# Patient Record
Sex: MALE | Race: BLACK OR AFRICAN AMERICAN | NOT HISPANIC OR LATINO | Employment: FULL TIME | URBAN - METROPOLITAN AREA
[De-identification: names, ages, dates, MRNs, and addresses within clinical notes are randomized per-mention and may not be internally consistent; named-entity substitution may affect disease eponyms.]

---

## 2023-05-26 ENCOUNTER — OFFICE VISIT (OUTPATIENT)
Dept: PRIMARY CARE | Facility: CLINIC | Age: 43
End: 2023-05-26
Payer: COMMERCIAL

## 2023-05-26 ENCOUNTER — LAB (OUTPATIENT)
Dept: LAB | Facility: LAB | Age: 43
End: 2023-05-26
Payer: COMMERCIAL

## 2023-05-26 VITALS
TEMPERATURE: 98.6 F | WEIGHT: 205 LBS | HEART RATE: 65 BPM | SYSTOLIC BLOOD PRESSURE: 124 MMHG | DIASTOLIC BLOOD PRESSURE: 83 MMHG | HEIGHT: 73 IN | BODY MASS INDEX: 27.17 KG/M2

## 2023-05-26 DIAGNOSIS — M76.892 HAMSTRING TENDONITIS OF LEFT THIGH: ICD-10-CM

## 2023-05-26 DIAGNOSIS — N18.31 STAGE 3A CHRONIC KIDNEY DISEASE (MULTI): ICD-10-CM

## 2023-05-26 DIAGNOSIS — E55.9 VITAMIN D DEFICIENCY: ICD-10-CM

## 2023-05-26 DIAGNOSIS — R73.03 PREDIABETES: ICD-10-CM

## 2023-05-26 DIAGNOSIS — R73.03 PREDIABETES: Primary | ICD-10-CM

## 2023-05-26 PROBLEM — J30.9 ALLERGIC RHINITIS: Status: ACTIVE | Noted: 2023-05-26

## 2023-05-26 PROBLEM — M70.50 PES ANSERINUS BURSITIS: Status: ACTIVE | Noted: 2021-08-24

## 2023-05-26 PROBLEM — Z52.4 KIDNEY DONOR: Status: ACTIVE | Noted: 2022-02-02

## 2023-05-26 PROBLEM — R79.89 ELEVATED SERUM CREATININE: Status: ACTIVE | Noted: 2022-02-02

## 2023-05-26 PROBLEM — M75.110 INCOMPLETE TEAR OF ROTATOR CUFF: Status: ACTIVE | Noted: 2018-11-27

## 2023-05-26 PROBLEM — M75.40 IMPINGEMENT SYNDROME OF SHOULDER REGION: Status: ACTIVE | Noted: 2018-11-27

## 2023-05-26 PROBLEM — L70.9 ACNE: Status: ACTIVE | Noted: 2023-05-26

## 2023-05-26 PROBLEM — N18.9 CKD (CHRONIC KIDNEY DISEASE): Status: ACTIVE | Noted: 2023-05-26

## 2023-05-26 PROBLEM — Z90.5 HISTORY OF KIDNEY DONATION: Status: ACTIVE | Noted: 2019-12-20

## 2023-05-26 PROBLEM — R22.2 MASS OF SUBCUTANEOUS TISSUE OF BACK: Status: ACTIVE | Noted: 2020-09-24

## 2023-05-26 PROBLEM — R53.83 FATIGUE: Status: ACTIVE | Noted: 2023-05-26

## 2023-05-26 PROBLEM — M25.519 SHOULDER PAIN: Status: ACTIVE | Noted: 2023-05-26

## 2023-05-26 LAB
ALANINE AMINOTRANSFERASE (SGPT) (U/L) IN SER/PLAS: 19 U/L (ref 10–52)
ALBUMIN (G/DL) IN SER/PLAS: 4.4 G/DL (ref 3.4–5)
ALBUMIN (MG/L) IN URINE: <7 MG/L
ALBUMIN/CREATININE (UG/MG) IN URINE: NORMAL UG/MG CRT (ref 0–30)
ALKALINE PHOSPHATASE (U/L) IN SER/PLAS: 57 U/L (ref 33–120)
ANION GAP IN SER/PLAS: 10 MMOL/L (ref 10–20)
ASPARTATE AMINOTRANSFERASE (SGOT) (U/L) IN SER/PLAS: 24 U/L (ref 9–39)
BILIRUBIN TOTAL (MG/DL) IN SER/PLAS: 0.7 MG/DL (ref 0–1.2)
CALCIDIOL (25 OH VITAMIN D3) (NG/ML) IN SER/PLAS: 47 NG/ML
CALCIUM (MG/DL) IN SER/PLAS: 9.7 MG/DL (ref 8.6–10.6)
CARBON DIOXIDE, TOTAL (MMOL/L) IN SER/PLAS: 29 MMOL/L (ref 21–32)
CHLORIDE (MMOL/L) IN SER/PLAS: 107 MMOL/L (ref 98–107)
CHOLESTEROL (MG/DL) IN SER/PLAS: 171 MG/DL (ref 0–199)
CHOLESTEROL IN HDL (MG/DL) IN SER/PLAS: 45.2 MG/DL
CHOLESTEROL/HDL RATIO: 3.8
CREATININE (MG/DL) IN SER/PLAS: 1.3 MG/DL (ref 0.5–1.3)
CREATININE (MG/DL) IN URINE: 141 MG/DL (ref 20–370)
ERYTHROCYTE DISTRIBUTION WIDTH (RATIO) BY AUTOMATED COUNT: 12.8 % (ref 11.5–14.5)
ERYTHROCYTE MEAN CORPUSCULAR HEMOGLOBIN CONCENTRATION (G/DL) BY AUTOMATED: 34.1 G/DL (ref 32–36)
ERYTHROCYTE MEAN CORPUSCULAR VOLUME (FL) BY AUTOMATED COUNT: 91 FL (ref 80–100)
ERYTHROCYTES (10*6/UL) IN BLOOD BY AUTOMATED COUNT: 4.96 X10E12/L (ref 4.5–5.9)
ESTIMATED AVERAGE GLUCOSE FOR HBA1C: 105 MG/DL
GFR MALE: 70 ML/MIN/1.73M2
GLUCOSE (MG/DL) IN SER/PLAS: 85 MG/DL (ref 74–99)
HEMATOCRIT (%) IN BLOOD BY AUTOMATED COUNT: 44.9 % (ref 41–52)
HEMOGLOBIN (G/DL) IN BLOOD: 15.3 G/DL (ref 13.5–17.5)
HEMOGLOBIN A1C/HEMOGLOBIN TOTAL IN BLOOD: 5.3 %
LDL: 115 MG/DL (ref 0–99)
LEUKOCYTES (10*3/UL) IN BLOOD BY AUTOMATED COUNT: 3.5 X10E9/L (ref 4.4–11.3)
NRBC (PER 100 WBCS) BY AUTOMATED COUNT: 0 /100 WBC (ref 0–0)
PLATELETS (10*3/UL) IN BLOOD AUTOMATED COUNT: 242 X10E9/L (ref 150–450)
POTASSIUM (MMOL/L) IN SER/PLAS: 4.6 MMOL/L (ref 3.5–5.3)
PROTEIN TOTAL: 7.1 G/DL (ref 6.4–8.2)
SODIUM (MMOL/L) IN SER/PLAS: 141 MMOL/L (ref 136–145)
TRIGLYCERIDE (MG/DL) IN SER/PLAS: 52 MG/DL (ref 0–149)
UREA NITROGEN (MG/DL) IN SER/PLAS: 15 MG/DL (ref 6–23)
VLDL: 10 MG/DL (ref 0–40)

## 2023-05-26 PROCEDURE — 80061 LIPID PANEL: CPT

## 2023-05-26 PROCEDURE — 83036 HEMOGLOBIN GLYCOSYLATED A1C: CPT

## 2023-05-26 PROCEDURE — 85027 COMPLETE CBC AUTOMATED: CPT

## 2023-05-26 PROCEDURE — 82570 ASSAY OF URINE CREATININE: CPT

## 2023-05-26 PROCEDURE — 99213 OFFICE O/P EST LOW 20 MIN: CPT | Performed by: INTERNAL MEDICINE

## 2023-05-26 PROCEDURE — 82043 UR ALBUMIN QUANTITATIVE: CPT

## 2023-05-26 PROCEDURE — 82306 VITAMIN D 25 HYDROXY: CPT

## 2023-05-26 PROCEDURE — 80053 COMPREHEN METABOLIC PANEL: CPT

## 2023-05-26 PROCEDURE — 36415 COLL VENOUS BLD VENIPUNCTURE: CPT

## 2023-05-26 PROCEDURE — 1036F TOBACCO NON-USER: CPT | Performed by: INTERNAL MEDICINE

## 2023-05-26 RX ORDER — PREDNISONE 10 MG/1
TABLET ORAL
Qty: 30 TABLET | Refills: 0 | Status: SHIPPED | OUTPATIENT
Start: 2023-05-26

## 2023-05-26 RX ORDER — AZITHROMYCIN 250 MG/1
TABLET, FILM COATED ORAL
COMMUNITY
End: 2023-05-26 | Stop reason: ALTCHOICE

## 2023-05-26 RX ORDER — EMTRICITABINE AND TENOFOVIR ALAFENAMIDE 200; 25 MG/1; MG/1
1 TABLET ORAL
Qty: 30 TABLET | Refills: 2 | COMMUNITY
Start: 2023-05-17 | End: 2023-08-15

## 2023-05-26 RX ORDER — TURMERIC 400 MG
1 CAPSULE ORAL
COMMUNITY
Start: 2021-01-01

## 2023-05-26 RX ORDER — CYANOCOBALAMIN (VITAMIN B-12) 500 MCG
400 TABLET ORAL
COMMUNITY

## 2023-05-26 RX ORDER — BENZONATATE 100 MG/1
CAPSULE ORAL
COMMUNITY
End: 2023-05-26 | Stop reason: ALTCHOICE

## 2023-05-26 RX ORDER — TIZANIDINE 2 MG/1
TABLET ORAL
COMMUNITY

## 2023-05-26 RX ORDER — MECOBALAMIN 1000 MCG
1 TABLET,CHEWABLE ORAL
COMMUNITY
Start: 2021-01-01

## 2023-05-26 RX ORDER — CLINDAMYCIN PHOSPHATE AND TRETINOIN GEL 1.2%/0.025% 10; .25 MG/G; MG/G
GEL TOPICAL
COMMUNITY

## 2023-05-26 RX ORDER — AMOXICILLIN AND CLAVULANATE POTASSIUM 875; 125 MG/1; MG/1
TABLET, FILM COATED ORAL EVERY 12 HOURS
COMMUNITY
End: 2023-05-26 | Stop reason: ALTCHOICE

## 2023-05-26 RX ORDER — TRIAMCINOLONE ACETONIDE 1 MG/G
CREAM TOPICAL
COMMUNITY

## 2023-05-26 RX ORDER — CETIRIZINE HYDROCHLORIDE 10 MG/1
TABLET ORAL
COMMUNITY

## 2023-05-26 RX ORDER — AZELASTINE HYDROCHLORIDE, FLUTICASONE PROPIONATE 137; 50 UG/1; UG/1
SPRAY, METERED NASAL EVERY 12 HOURS
COMMUNITY

## 2023-05-26 RX ORDER — BIOTIN 10 MG
1 TABLET ORAL
COMMUNITY
Start: 2021-11-29

## 2023-05-26 ASSESSMENT — ENCOUNTER SYMPTOMS
COUGH: 0
FEVER: 0
PALPITATIONS: 0
SHORTNESS OF BREATH: 0
CHILLS: 0
POLYDIPSIA: 0

## 2023-05-26 NOTE — PROGRESS NOTES
"Subjective   Patient ID: Dr. Dinh or Fabrizio Neftali is a 42 y.o. male who presents for No chief complaint on file..    Patient presents for routine follow-up on chronic kidney disease in the setting of solitary kidney.  Labs ordered for routine monitoring.  Still on Descovy for PrEP and labs are up-to-date from his specialist providing this medication to him.      Recently resumed some more intensive exercise with jogging the last 3 weeks has developed some posterior left knee discomfort that is not a result of an injury.  There is no laxity or instability of the knee.  No swelling is developed.  The pain is primarily posterior and without significant radiation to other areas.  No associated back pain hip pain numbness or tingling of any location.             Review of Systems   Constitutional:  Negative for chills and fever.   Respiratory:  Negative for cough and shortness of breath.    Cardiovascular:  Negative for chest pain and palpitations.   Endocrine: Negative for polydipsia and polyuria.       Objective   /83   Pulse 65   Temp 37 °C (98.6 °F) (Temporal)   Ht 1.854 m (6' 1\")   Wt 93 kg (205 lb)   BMI 27.05 kg/m²     Physical Exam  Constitutional:       Appearance: Normal appearance.   HENT:      Head: Normocephalic and atraumatic.   Eyes:      Extraocular Movements: Extraocular movements intact.      Pupils: Pupils are equal, round, and reactive to light.   Neck:      Thyroid: No thyroid mass or thyromegaly.      Vascular: No carotid bruit.   Cardiovascular:      Rate and Rhythm: Normal rate and regular rhythm.      Heart sounds: No murmur heard.     No friction rub. No gallop.   Pulmonary:      Effort: No respiratory distress.      Breath sounds: No wheezing, rhonchi or rales.   Musculoskeletal:      Cervical back: Neck supple.      Right lower leg: No edema.      Left lower leg: No edema.   Lymphadenopathy:      Cervical: No cervical adenopathy.   Neurological:      Mental Status: He is alert. "         Assessment/Plan   Problem List Items Addressed This Visit          Genitourinary    CKD (chronic kidney disease)    Relevant Orders    Lipid Panel    Vitamin D, Total    CBC    Comprehensive Metabolic Panel    Hemoglobin A1C    Albumin , Urine Random       Endocrine/Metabolic    Prediabetes - Primary    Relevant Orders    Lipid Panel    Vitamin D, Total    CBC    Comprehensive Metabolic Panel    Hemoglobin A1C    Vitamin D deficiency    Relevant Orders    Lipid Panel    Vitamin D, Total    CBC    Comprehensive Metabolic Panel    Hemoglobin A1C     Other Visit Diagnoses       Hamstring tendonitis of left thigh        Relevant Medications    predniSONE (Deltasone) 10 mg tablet

## 2023-05-30 NOTE — RESULT ENCOUNTER NOTE
Patient labs look good.  No concerns about his current levels.  No signs of cholesterol issues new signs of kidney disease or any other deficiencies. Zainab is requesting a medication refill on Beau's behalf for Seroquel 25mg;#30 with 0 refills;last with a start and refill date of 06/21/21. Medication is pending your approval for a 30 day supply with 0 refills; he is scheduled to return on 08/02/21. Last seen in 02/12/2020 with a note on his appt desk that he MUST ATTEND APPT.

## 2023-06-15 DIAGNOSIS — J02.9 PHARYNGITIS, UNSPECIFIED ETIOLOGY: Primary | ICD-10-CM

## 2023-06-15 RX ORDER — AMOXICILLIN 875 MG/1
875 TABLET, FILM COATED ORAL 2 TIMES DAILY
Qty: 14 TABLET | Refills: 0 | Status: SHIPPED | OUTPATIENT
Start: 2023-06-15 | End: 2023-06-22

## 2023-07-14 DIAGNOSIS — M79.89 SOFT TISSUE MASS: Primary | ICD-10-CM

## 2023-07-31 DIAGNOSIS — M76.892 HAMSTRING TENDONITIS OF LEFT THIGH: ICD-10-CM

## 2023-08-24 DIAGNOSIS — S82.001A CLOSED NONDISPLACED FRACTURE OF RIGHT PATELLA, UNSPECIFIED FRACTURE MORPHOLOGY, INITIAL ENCOUNTER: Primary | ICD-10-CM

## 2023-09-11 RX ORDER — PREDNISONE 10 MG/1
TABLET ORAL
Qty: 30 TABLET | Refills: 0 | OUTPATIENT
Start: 2023-09-11

## 2023-11-29 ENCOUNTER — APPOINTMENT (OUTPATIENT)
Dept: PRIMARY CARE | Facility: CLINIC | Age: 43
End: 2023-11-29
Payer: COMMERCIAL